# Patient Record
Sex: FEMALE | Race: WHITE | Employment: OTHER | URBAN - METROPOLITAN AREA
[De-identification: names, ages, dates, MRNs, and addresses within clinical notes are randomized per-mention and may not be internally consistent; named-entity substitution may affect disease eponyms.]

---

## 2017-04-27 ENCOUNTER — OP HISTORICAL/CONVERTED ENCOUNTER (OUTPATIENT)
Dept: OTHER | Age: 62
End: 2017-04-27

## 2017-06-28 ENCOUNTER — ANESTHESIA EVENT (OUTPATIENT)
Dept: ENDOSCOPY | Age: 62
End: 2017-06-28
Payer: MEDICARE

## 2017-06-28 ENCOUNTER — ANESTHESIA (OUTPATIENT)
Dept: ENDOSCOPY | Age: 62
End: 2017-06-28
Payer: MEDICARE

## 2017-06-28 ENCOUNTER — APPOINTMENT (OUTPATIENT)
Dept: ULTRASOUND IMAGING | Age: 62
End: 2017-06-28
Attending: INTERNAL MEDICINE
Payer: MEDICARE

## 2017-06-28 ENCOUNTER — HOSPITAL ENCOUNTER (OUTPATIENT)
Age: 62
Setting detail: OUTPATIENT SURGERY
Discharge: HOME | End: 2017-06-28
Attending: INTERNAL MEDICINE | Admitting: INTERNAL MEDICINE
Payer: MEDICARE

## 2017-06-28 VITALS
HEART RATE: 73 BPM | BODY MASS INDEX: 26.46 KG/M2 | DIASTOLIC BLOOD PRESSURE: 69 MMHG | TEMPERATURE: 97.2 F | WEIGHT: 155 LBS | SYSTOLIC BLOOD PRESSURE: 128 MMHG | RESPIRATION RATE: 15 BRPM | OXYGEN SATURATION: 95 % | HEIGHT: 64 IN

## 2017-06-28 PROCEDURE — 76060000031 HC ANESTHESIA FIRST 0.5 HR: Performed by: INTERNAL MEDICINE

## 2017-06-28 PROCEDURE — 74011000250 HC RX REV CODE- 250

## 2017-06-28 PROCEDURE — 76040000019: Performed by: INTERNAL MEDICINE

## 2017-06-28 PROCEDURE — 74011250636 HC RX REV CODE- 250/636

## 2017-06-28 RX ORDER — HYDROCHLOROTHIAZIDE 12.5 MG/1
12.5 TABLET ORAL DAILY
COMMUNITY

## 2017-06-28 RX ORDER — SODIUM CHLORIDE 9 MG/ML
INJECTION, SOLUTION INTRAVENOUS
Status: DISCONTINUED | OUTPATIENT
Start: 2017-06-28 | End: 2017-06-28 | Stop reason: HOSPADM

## 2017-06-28 RX ORDER — FLUMAZENIL 0.1 MG/ML
0.2 INJECTION INTRAVENOUS
Status: DISCONTINUED | OUTPATIENT
Start: 2017-06-28 | End: 2017-06-28 | Stop reason: HOSPADM

## 2017-06-28 RX ORDER — EPINEPHRINE 0.1 MG/ML
1 INJECTION INTRACARDIAC; INTRAVENOUS
Status: DISCONTINUED | OUTPATIENT
Start: 2017-06-28 | End: 2017-06-28 | Stop reason: HOSPADM

## 2017-06-28 RX ORDER — GABAPENTIN 600 MG/1
TABLET ORAL 3 TIMES DAILY
COMMUNITY

## 2017-06-28 RX ORDER — DEXTROMETHORPHAN/PSEUDOEPHED 2.5-7.5/.8
1.2 DROPS ORAL
Status: DISCONTINUED | OUTPATIENT
Start: 2017-06-28 | End: 2017-06-28 | Stop reason: HOSPADM

## 2017-06-28 RX ORDER — MIDAZOLAM HYDROCHLORIDE 1 MG/ML
.25-1 INJECTION, SOLUTION INTRAMUSCULAR; INTRAVENOUS
Status: DISCONTINUED | OUTPATIENT
Start: 2017-06-28 | End: 2017-06-28 | Stop reason: HOSPADM

## 2017-06-28 RX ORDER — NIFEDIPINE 60 MG/1
60 TABLET, EXTENDED RELEASE ORAL DAILY
Status: ON HOLD | COMMUNITY
End: 2021-07-05

## 2017-06-28 RX ORDER — SODIUM CHLORIDE 0.9 % (FLUSH) 0.9 %
5-10 SYRINGE (ML) INJECTION EVERY 8 HOURS
Status: DISCONTINUED | OUTPATIENT
Start: 2017-06-28 | End: 2017-06-28 | Stop reason: HOSPADM

## 2017-06-28 RX ORDER — PRAVASTATIN SODIUM 20 MG/1
20 TABLET ORAL
COMMUNITY

## 2017-06-28 RX ORDER — SODIUM CHLORIDE 0.9 % (FLUSH) 0.9 %
5-10 SYRINGE (ML) INJECTION AS NEEDED
Status: DISCONTINUED | OUTPATIENT
Start: 2017-06-28 | End: 2017-06-28 | Stop reason: HOSPADM

## 2017-06-28 RX ORDER — METHOCARBAMOL 750 MG/1
TABLET, FILM COATED ORAL 4 TIMES DAILY
Status: ON HOLD | COMMUNITY
End: 2021-07-05

## 2017-06-28 RX ORDER — LIDOCAINE HYDROCHLORIDE 20 MG/ML
INJECTION, SOLUTION INFILTRATION; PERINEURAL AS NEEDED
Status: DISCONTINUED | OUTPATIENT
Start: 2017-06-28 | End: 2017-06-28 | Stop reason: HOSPADM

## 2017-06-28 RX ORDER — ALBUTEROL SULFATE 90 UG/1
AEROSOL, METERED RESPIRATORY (INHALATION)
COMMUNITY

## 2017-06-28 RX ORDER — SODIUM CHLORIDE 9 MG/ML
50 INJECTION, SOLUTION INTRAVENOUS CONTINUOUS
Status: DISCONTINUED | OUTPATIENT
Start: 2017-06-28 | End: 2017-06-28 | Stop reason: HOSPADM

## 2017-06-28 RX ORDER — OXYCODONE HYDROCHLORIDE 5 MG/1
5 TABLET ORAL
COMMUNITY

## 2017-06-28 RX ORDER — ZOLPIDEM TARTRATE 12.5 MG/1
12.5 TABLET, FILM COATED, EXTENDED RELEASE ORAL
COMMUNITY

## 2017-06-28 RX ORDER — HYDROGEN PEROXIDE 3 %
SOLUTION, NON-ORAL MISCELLANEOUS DAILY
COMMUNITY

## 2017-06-28 RX ORDER — NALOXONE HYDROCHLORIDE 0.4 MG/ML
0.4 INJECTION, SOLUTION INTRAMUSCULAR; INTRAVENOUS; SUBCUTANEOUS
Status: DISCONTINUED | OUTPATIENT
Start: 2017-06-28 | End: 2017-06-28 | Stop reason: HOSPADM

## 2017-06-28 RX ORDER — ATROPINE SULFATE 0.1 MG/ML
0.5 INJECTION INTRAVENOUS
Status: DISCONTINUED | OUTPATIENT
Start: 2017-06-28 | End: 2017-06-28 | Stop reason: HOSPADM

## 2017-06-28 RX ORDER — ALPRAZOLAM 1 MG/1
0.5 TABLET ORAL
COMMUNITY

## 2017-06-28 RX ORDER — GLYCOPYRROLATE 0.2 MG/ML
INJECTION INTRAMUSCULAR; INTRAVENOUS AS NEEDED
Status: DISCONTINUED | OUTPATIENT
Start: 2017-06-28 | End: 2017-06-28 | Stop reason: HOSPADM

## 2017-06-28 RX ORDER — FENTANYL CITRATE 50 UG/ML
100 INJECTION, SOLUTION INTRAMUSCULAR; INTRAVENOUS
Status: DISCONTINUED | OUTPATIENT
Start: 2017-06-28 | End: 2017-06-28 | Stop reason: HOSPADM

## 2017-06-28 RX ORDER — PROPOFOL 10 MG/ML
INJECTION, EMULSION INTRAVENOUS AS NEEDED
Status: DISCONTINUED | OUTPATIENT
Start: 2017-06-28 | End: 2017-06-28 | Stop reason: HOSPADM

## 2017-06-28 RX ADMIN — PROPOFOL 40 MG: 10 INJECTION, EMULSION INTRAVENOUS at 14:44

## 2017-06-28 RX ADMIN — PROPOFOL 20 MG: 10 INJECTION, EMULSION INTRAVENOUS at 14:49

## 2017-06-28 RX ADMIN — PROPOFOL 20 MG: 10 INJECTION, EMULSION INTRAVENOUS at 14:46

## 2017-06-28 RX ADMIN — PROPOFOL 20 MG: 10 INJECTION, EMULSION INTRAVENOUS at 14:53

## 2017-06-28 RX ADMIN — PROPOFOL 80 MG: 10 INJECTION, EMULSION INTRAVENOUS at 14:43

## 2017-06-28 RX ADMIN — PROPOFOL 10 MG: 10 INJECTION, EMULSION INTRAVENOUS at 14:56

## 2017-06-28 RX ADMIN — PROPOFOL 20 MG: 10 INJECTION, EMULSION INTRAVENOUS at 14:48

## 2017-06-28 RX ADMIN — LIDOCAINE HYDROCHLORIDE 100 MG: 20 INJECTION, SOLUTION INFILTRATION; PERINEURAL at 14:42

## 2017-06-28 RX ADMIN — PROPOFOL 10 MG: 10 INJECTION, EMULSION INTRAVENOUS at 14:58

## 2017-06-28 RX ADMIN — GLYCOPYRROLATE 0.2 MG: 0.2 INJECTION INTRAMUSCULAR; INTRAVENOUS at 14:29

## 2017-06-28 RX ADMIN — PROPOFOL 20 MG: 10 INJECTION, EMULSION INTRAVENOUS at 14:47

## 2017-06-28 RX ADMIN — PROPOFOL 10 MG: 10 INJECTION, EMULSION INTRAVENOUS at 14:55

## 2017-06-28 RX ADMIN — SODIUM CHLORIDE: 9 INJECTION, SOLUTION INTRAVENOUS at 14:58

## 2017-06-28 RX ADMIN — SODIUM CHLORIDE: 9 INJECTION, SOLUTION INTRAVENOUS at 14:28

## 2017-06-28 RX ADMIN — PROPOFOL 20 MG: 10 INJECTION, EMULSION INTRAVENOUS at 14:51

## 2017-06-28 NOTE — PROCEDURES
118 [facility]  [address] [address] [address], [address]   [phone]                                Endoscopic Ultrasound    NAME:  Michelle Pierson   :   1955   MRN:   [id number]       Date/Time:  2017   Procedure Type: Linear Upper EUS without FNA    Indications: Abnormal CT scan showing dilated bile duct    Pre-operative Diagnosis: see indication above    Post-operative Diagnosis:  See findings below    : Jaiden Bernard MD    Referring Provider: Melissa Herrera MD, -RUDOLPH Chris    Anethesia/Sedation:  MAC anesthesia      Procedure Details   After infom consent was obtained for the procedure, with all risks and benefits of procedure explained the patient was taken to the endoscopy suite and placed in the left lateral decubitus position. Following sequential administration of sedation as per above, the linear echoendoscope was inserted into the mouth and advanced under direct vision to second portion of the duodenum. A careful inspection was made as the gastroscope was withdrawn, including a retroflexed view of the proximal stomach; findings and interventions are described below. 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 Ampulla was normal.   Pancreas:     Areas examined: the entire gland    Parenchyma: -The entire pancreas appeared normal. No mass lesion or cyst were seen. Pancreatic Duct: The entire pancreatic duct was normal in calibre. PD was about 3 mm in head and 2 mm in body. No filing defect or mass lesion was seen   Liver:     Parenchyma: not examined    Gallbladder: surgically absent    Bile Duct: There was cystic dilation of common bile duct extending into the central right and left hepatic ducts. Largest diameter was about 18 mm.  No filing defect or stricture was seen. The bile duct diffusely tapered into the ampulla. Lymph Node: no adenopathy        Specimen Removed:  None    Complications: None. EBL:  None.     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

## 2017-06-28 NOTE — ROUTINE PROCESS
Estella Randall  1955  [id number]    Situation:  Verbal report received from: Clint Bledsoe  Procedure: Procedure(s):  ENDOSCOPIC ULTRASOUND (EUS) UPPER LINEAR W/OUT PATH  ESOPHAGOGASTRODUODENOSCOPY (EGD)    Background:    Preoperative diagnosis: STRICTURE BOWEL DUCT   GERD   Postoperative diagnosis: 1.- Diverticulum  2.- Dilated Common Bile Duct    :  Dr. Marilyn Murray  Assistant(s): Endoscopy Technician-1: Zaheer Stone  Endoscopy RN-1: Rossana Thompson RN    Specimens: * No specimens in log *  H. Pylori  no    Assessment:  Intra-procedure medications   Anesthesia gave intra-procedure sedation and medications, see anesthesia flow sheet yes    Intravenous fluids: NS@ KVO     Vital signs stable     Abdominal assessment: round and soft     Recommendation:  Discharge patient per MD order.     Family or Friend   Permission to share finding with family or friend yes

## 2017-06-28 NOTE — DISCHARGE INSTRUCTIONS
118 [facility]  [address] [facility]  [phone]                     DISCHARGE INSTRUCTIONS    Lane Oconnor  [id number]  1955    DISCOMFORT:  Sore throat- throat lozenges or warm salt water gargle  redness at IV site- apply warm compress to area; if redness or soreness persist- contact your physician  Gaseous discomfort- walking, belching will help relieve any discomfort  You may not operate a vehicle for 12 hours  You may not engage in an occupation involving machinery or appliances for rest of today  You may not drink alcoholic beverages for at least 12 hours  Avoid making any critical decisions for at least 24 hour  DIET  You may eat and drink after you leave. You may resume your regular diet - however -  remember your colon is empty and a heavy meal will produce gas. Avoid these foods:  vegetables, fried / greasy foods, carbonated drinks    ACTIVITY  You may resume your normal daily activities   Spend the remainder of the day resting -  avoid any strenuous activity. CALL M.D. ANY SIGN OF   Increasing pain, nausea, vomiting  Abdominal distension (swelling)  New increased bleeding (oral or rectal)  Fever (chills)  Pain in chest area  Bloody discharge from nose or mouth  Shortness of breath    Follow-up Instructions:   Call Dr. Nuvia Sharp for any questions or problems. If we took a biopsy please call the office within 2 weeks to discuss your                             pathology results. Telephone # [phone]        Continue same medications.

## 2017-06-28 NOTE — IP AVS SNAPSHOT
[address] 
[phone] Patient: Jani Raymond MRN: [id number] HSY:[id number] You are allergic to the following No active allergies Recent Documentation Height Weight Breastfeeding? BMI Smoking Status 1.626 m 70.3 kg No 26.61 kg/m2 Current Some Day Smoker Emergency Contacts Name Discharge Info Relation Home Work Mobile [facility] DIS[facility] CAREGIVER [3] Sister [23] [phone] CAREGIVER [3] Spouse [3] [phone] About your hospitalization You were admitted on:  June 28, 2017 You last received care in the:  [facility] ENDOSCOPY You were discharged on:  June 28, 2017 Unit phone number:  [phone] Why you were hospitalized Your primary diagnosis was:  Not on File Providers Seen During Your Hospitalizations Provider Role Specialty Primary office phone Jan White MD Attending Provider Gastroenterology [phone] Your Primary Care Physician (PCP) Primary Care Physician Office Phone Office Fax Araseli Johnson [phone] Follow-up Information None Your Appointments Wednesday June 28, 2017  3:25 PM EDT  
XR ENDO ENDOSCOPIC ULTRASOUND with [facility] ENDO US [address] Department ([address]) [address]  
[phone] Patient should report to outpatient registration ([address] [place] [address]) 30 minutes prior to the appointment time unless instructed otherwise. (NOT FOR MRI) Current Discharge Medication List  
  
CONTINUE these medications which have NOT CHANGED Dose & Instructions Dispensing Information Comments Morning Noon Evening Bedtime AMBIEN CR 12.5 mg tablet Generic drug:  zolpidem CR Your last dose was: Your next dose is: Dose:  12.5 mg Take 12.5 mg by mouth nightly as needed for Sleep. Refills:  0  
     
   
   
   
  
 gabapentin 600 mg tablet Commonly known as:  NEURONTIN Your last dose was: Your next dose is: Take  by mouth three (3) times daily. Refills:  0  
     
   
   
   
  
 hydroCHLOROthiazide 25 mg tablet Commonly known as:  HYDRODIURIL Your last dose was: Your next dose is:    
   
   
 Dose:  25 mg Take 25 mg by mouth daily. Refills:  0 NexIUM 20 mg capsule Generic drug:  esomeprazole Your last dose was: Your next dose is: Take  by mouth daily. Refills:  0  
     
   
   
   
  
 NIFEdipine ER 60 mg ER tablet Commonly known as:  ADALAT CC Your last dose was: Your next dose is:    
   
   
 Dose:  60 mg Take 60 mg by mouth daily. Refills:  0  
     
   
   
   
  
 oxyCODONE IR 5 mg immediate release tablet Commonly known as:  Nai Johanna Your last dose was: Your next dose is:    
   
   
 Dose:  5 mg Take 5 mg by mouth every four (4) hours as needed for Pain. Refills:  0  
     
   
   
   
  
 pravastatin 20 mg tablet Commonly known as:  PRAVACHOL Your last dose was: Your next dose is:    
   
   
 Dose:  20 mg Take 20 mg by mouth nightly. Refills:  0  
     
   
   
   
  
 ROBAXIN-750 750 mg tablet Generic drug:  methocarbamol Your last dose was: Your next dose is: Take  by mouth four (4) times daily. Refills:  0 VENTOLIN HFA 90 mcg/actuation inhaler Generic drug:  albuterol Your last dose was: Your next dose is: Take  by inhalation. Refills:  0  
     
   
   
   
  
 XANAX 1 mg tablet Generic drug:  ALPRAZolam  
   
Your last dose was: Your next dose is: Take  by mouth. Refills:  0 Discharge Instructions [address] [place] 
[address] [facility] [address] [place], [address] 
[phone] DISCHARGE INSTRUCTIONS Briana Whittaker [id number] 
1955 DISCOMFORT: 
Sore throat- throat lozenges or warm salt water gargle 
redness at IV site- apply warm compress to area; if redness or soreness persist- contact your physician Gaseous discomfort- walking, belching will help relieve any discomfort You may not operate a vehicle for 12 hours You may not engage in an occupation involving machinery or appliances for rest of today You may not drink alcoholic beverages for at least 12 hours Avoid making any critical decisions for at least 24 hour DIET You may eat and drink after you leave. You may resume your regular diet  however -  remember your colon is empty and a heavy meal will produce gas. Avoid these foods:  vegetables, fried / greasy foods, carbonated drinks ACTIVITY You may resume your normal daily activities Spend the remainder of the day resting -  avoid any strenuous activity. CALL M.D. ANY SIGN OF Increasing pain, nausea, vomiting Abdominal distension (swelling) New increased bleeding (oral or rectal) Fever (chills) Pain in chest area Bloody discharge from nose or mouth Shortness of breath Follow-up Instructions: 
 Call Dr. Kemal Manzanares for any questions or problems. If we took a biopsy please call the office within 2 weeks to discuss your                             pathology results. Telephone # [phone] Continue same medications. Discharge Orders None Introducing [facility]! [employer] introduces [employer] patient portal. Now you can access parts of your medical record, email your doctor's office, and request medication refills online. 1. In your internet browser, go to [email or web address]. [email or web address] 2. Click on the First Time User? Click Here link in the Sign In box.  You will see the New Member Sign Up page. 3. Enter your [employer] Access Code exactly as it appears below. You will not need to use this code after youve completed the sign-up process. If you do not sign up before the expiration date, you must request a new code. · [employer] Access Code: [email or web address] Expires: 9/26/2017  3:23 PM 
 
4. Enter the last four digits of your Social Security Number (xxxx) and Date of Birth (mm/dd/yyyy) as indicated and click Submit. You will be taken to the next sign-up page. 5. Create a [employer] ID. This will be your [employer] login ID and cannot be changed, so think of one that is secure and easy to remember. 6. Create a [employer] password. You can change your password at any time. 7. Enter your Password Reset Question and Answer. This can be used at a later time if you forget your password. 8. Enter your e-mail address. You will receive e-mail notification when new information is available in [address]. 9. Click Sign Up. You can now view and download portions of your medical record. 10. Click the Download Summary menu link to download a portable copy of your medical information. If you have questions, please visit the Frequently Asked Questions section of the [employer] website. Remember, [employer] is NOT to be used for urgent needs. For medical emergencies, dial 911. Now available from your iPhone and Android! General Information Please provide this summary of care documentation to your next provider. Patient Signature:  ____________________________________________________________ Date:  ____________________________________________________________  
  
[facility] Provider Signature:  ____________________________________________________________ Date:  ____________________________________________________________

## 2017-06-28 NOTE — ANESTHESIA POSTPROCEDURE EVALUATION
Post-Anesthesia Evaluation and Assessment    Patient: Delvis Monk MRN: [id number]  SSN: [id number]    YOB: 1955  Age: 62 y.o. Sex: female       Cardiovascular Function/Vital Signs  Visit Vitals    /45    Pulse 76    Temp 36.2 °C (97.2 °F)    Resp 22    Ht 5' 4\" (1.626 m)    Wt 70.3 kg (155 lb)    SpO2 96%    Breastfeeding No    BMI 26.61 kg/m2       Patient is status post MAC anesthesia for Procedure(s):  ENDOSCOPIC ULTRASOUND (EUS) UPPER LINEAR W/OUT PATH  ESOPHAGOGASTRODUODENOSCOPY (EGD). Nausea/Vomiting: None    Postoperative hydration reviewed and adequate. Pain:  Pain Scale 1: Numeric (0 - 10) (06/28/17 1512)  Pain Intensity 1: 0 (06/28/17 1512)   Managed    Neurological Status: At baseline    Mental Status and Level of Consciousness: Arousable    Pulmonary Status:   O2 Device: Room air (06/28/17 1512)   Adequate oxygenation and airway patent    Complications related to anesthesia: None    Post-anesthesia assessment completed.  No concerns    Signed By: Yoon Valiente MD     June 28, 2017

## 2017-06-28 NOTE — ANESTHESIA PREPROCEDURE EVALUATION
Anesthetic History   No history of anesthetic complications            Review of Systems / Medical History  Patient summary reviewed, nursing notes reviewed and pertinent labs reviewed    Pulmonary    COPD: mild      Smoker         Neuro/Psych   Within defined limits           Cardiovascular    Hypertension              Exercise tolerance: >4 METS     GI/Hepatic/Renal     GERD          Comments: Bile duct stricture Endo/Other        Arthritis     Other Findings            Physical Exam    Airway  Mallampati: I  TM Distance: > 6 cm    Mouth opening: Normal     Cardiovascular    Rhythm: regular  Rate: normal         Dental  No notable dental hx       Pulmonary  Breath sounds clear to auscultation               Abdominal  Abdominal exam normal       Other Findings            Anesthetic Plan    ASA: 2  Anesthesia type: MAC          Induction: Intravenous  Anesthetic plan and risks discussed with: Patient

## 2017-06-28 NOTE — PROGRESS NOTES

## 2017-06-28 NOTE — H&P
118 [facility]3].  [address] [facility] [address], [address]  [phone]                                History and Physical     NAME: Estella Randall   :  1955   MRN:  [id number]     HPI:  The patient was seen and examined.     Past Surgical History:   Procedure Laterality Date    ABDOMEN SURGERY PROC UNLISTED      HX GYN      Partial hysterectomy    HX ORTHOPAEDIC      Left hand ganglion cyst removal    HX OTHER SURGICAL      Cholecystectomy    NEUROLOGICAL PROCEDURE UNLISTED      Neck fusion and tumor excised from the neck     Past Medical History:   Diagnosis Date    Arthritis     Asthma     Chronic obstructive pulmonary disease (HCC)     Chronic pain     Hypertension     Nicotine vapor product user      Social History   Substance Use Topics    Smoking status: Current Some Day Smoker     Packs/day: 1.00     Years: 40.00    Smokeless tobacco: Never Used    Alcohol use No     No Known Allergies  Family History   Problem Relation Age of Onset    Alzheimer Mother     Heart Disease Father      Current Facility-Administered Medications   Medication Dose Route Frequency    0.9% sodium chloride infusion  50 mL/hr IntraVENous CONTINUOUS    sodium chloride (NS) flush 5-10 mL  5-10 mL IntraVENous Q8H    sodium chloride (NS) flush 5-10 mL  5-10 mL IntraVENous PRN    midazolam (VERSED) injection 0.25-10 mg  0.25-10 mg IntraVENous Multiple    fentaNYL citrate (PF) injection 100 mcg  100 mcg IntraVENous MULTIPLE DOSE GIVEN    naloxone (NARCAN) injection 0.4 mg  0.4 mg IntraVENous Multiple    flumazenil (ROMAZICON) 0.1 mg/mL injection 0.2 mg  0.2 mg IntraVENous Multiple    simethicone (MYLICON) 12KO/7.3CF oral drops 80 mg  1.2 mL Oral Multiple    atropine injection 0.5 mg  0.5 mg IntraVENous ONCE PRN    EPINEPHrine (ADRENALIN) 0.1 mg/mL syringe 1 mg  1 mg Endoscopically ONCE PRN     Facility-Administered Medications Ordered in Other Encounters   Medication Dose Route Frequency    0.9% sodium chloride infusion   IntraVENous CONTINUOUS    glycopyrrolate (ROBINUL) injection   IntraVENous PRN         PHYSICAL EXAM:  General: WD, WN. Alert, cooperative, no acute distress    HEENT: NC, Atraumatic. PERRLA, EOMI. Anicteric sclerae. Lungs:  CTA Bilaterally. No Wheezing/Rhonchi/Rales. Heart:  Regular  rhythm,  No murmur, No Rubs, No Gallops  Abdomen: Soft, Non distended, Non tender.  +Bowel sounds, no HSM  Extremities: No c/c/e  Neurologic:  CN 2-12 gi, Alert and oriented X 3. No acute neurological distress   Psych:   Good insight. Not anxious nor agitated. The heart, lungs and mental status were satisfactory for the administration of MAC sedation and for the procedure.       Mallampati score: 3       Assessment:   · R/O CBD stone    Plan:   · Endoscopic procedure  · MAC sedation   ·

## 2017-08-10 ENCOUNTER — OP HISTORICAL/CONVERTED ENCOUNTER (OUTPATIENT)
Dept: OTHER | Age: 62
End: 2017-08-10

## 2018-05-24 ENCOUNTER — OP HISTORICAL/CONVERTED ENCOUNTER (OUTPATIENT)
Dept: OTHER | Age: 63
End: 2018-05-24

## 2020-06-09 ENCOUNTER — OP HISTORICAL/CONVERTED ENCOUNTER (OUTPATIENT)
Dept: OTHER | Age: 65
End: 2020-06-09

## 2021-06-07 ENCOUNTER — TRANSCRIBE ORDER (OUTPATIENT)
Dept: SCHEDULING | Age: 66
End: 2021-06-07

## 2021-06-07 DIAGNOSIS — M54.2 CERVICALGIA: Primary | ICD-10-CM

## 2021-06-07 DIAGNOSIS — M48.02 CERVICAL STENOSIS OF SPINE: ICD-10-CM

## 2021-06-08 ENCOUNTER — TRANSCRIBE ORDER (OUTPATIENT)
Dept: SCHEDULING | Age: 66
End: 2021-06-08

## 2021-06-08 DIAGNOSIS — Q44.4 CHOLEDOCHAL CYST: Primary | ICD-10-CM

## 2021-06-08 DIAGNOSIS — R11.0 NAUSEA: ICD-10-CM

## 2021-06-17 ENCOUNTER — TRANSCRIBE ORDER (OUTPATIENT)
Dept: SCHEDULING | Age: 66
End: 2021-06-17

## 2021-06-17 DIAGNOSIS — E78.41 ELEVATED LIPOPROTEIN A LEVEL: ICD-10-CM

## 2021-06-17 DIAGNOSIS — M06.9 ATROPHIC ARTHRITIS (HCC): Primary | ICD-10-CM

## 2021-06-17 DIAGNOSIS — M48.02 CERVICAL STENOSIS OF SPINE: ICD-10-CM

## 2021-06-17 DIAGNOSIS — M54.2 CERVICALGIA: ICD-10-CM

## 2021-06-24 ENCOUNTER — TRANSCRIBE ORDER (OUTPATIENT)
Dept: REGISTRATION | Age: 66
End: 2021-06-24

## 2021-06-24 ENCOUNTER — HOSPITAL ENCOUNTER (OUTPATIENT)
Dept: LAB | Age: 66
Discharge: HOME | End: 2021-06-24
Payer: MEDICARE

## 2021-06-24 ENCOUNTER — HOSPITAL ENCOUNTER (OUTPATIENT)
Dept: MRI IMAGING | Age: 66
Discharge: HOME | End: 2021-06-24
Payer: MEDICARE

## 2021-06-24 DIAGNOSIS — Q44.4 CONGENITAL CHOLEDOCHAL CYST: ICD-10-CM

## 2021-06-24 DIAGNOSIS — Q44.4 CONGENITAL CHOLEDOCHAL CYST: Primary | ICD-10-CM

## 2021-06-24 DIAGNOSIS — Q44.4 CHOLEDOCHAL CYST: ICD-10-CM

## 2021-06-24 DIAGNOSIS — R11.0 NAUSEA: ICD-10-CM

## 2021-06-24 LAB — CREAT SERPL-MCNC: 0.61 MG/DL (ref 0.55–1.02)

## 2021-06-24 PROCEDURE — A9577 INJ MULTIHANCE: HCPCS | Performed by: SPECIALIST

## 2021-06-24 PROCEDURE — 82565 ASSAY OF CREATININE: CPT

## 2021-06-24 PROCEDURE — 36415 COLL VENOUS BLD VENIPUNCTURE: CPT

## 2021-06-24 PROCEDURE — 74183 MRI ABD W/O CNTR FLWD CNTR: CPT

## 2021-06-24 PROCEDURE — 74011250636 HC RX REV CODE- 250/636: Performed by: SPECIALIST

## 2021-06-24 RX ADMIN — GADOBENATE DIMEGLUMINE 18 ML: 529 INJECTION, SOLUTION INTRAVENOUS at 13:00

## 2021-07-05 ENCOUNTER — HOSPITAL ENCOUNTER (OUTPATIENT)
Age: 66
Setting detail: OUTPATIENT SURGERY
Discharge: HOME | End: 2021-07-05
Attending: ORTHOPAEDIC SURGERY | Admitting: ORTHOPAEDIC SURGERY
Payer: MEDICARE

## 2021-07-05 ENCOUNTER — APPOINTMENT (OUTPATIENT)
Dept: GENERAL RADIOLOGY | Age: 66
End: 2021-07-05
Attending: ORTHOPAEDIC SURGERY
Payer: MEDICARE

## 2021-07-05 ENCOUNTER — ANESTHESIA (OUTPATIENT)
Dept: SURGERY | Age: 66
End: 2021-07-05
Payer: MEDICARE

## 2021-07-05 ENCOUNTER — ANESTHESIA EVENT (OUTPATIENT)
Dept: SURGERY | Age: 66
End: 2021-07-05
Payer: MEDICARE

## 2021-07-05 VITALS
TEMPERATURE: 97.6 F | BODY MASS INDEX: 29.7 KG/M2 | WEIGHT: 173.94 LBS | HEART RATE: 87 BPM | HEIGHT: 64 IN | RESPIRATION RATE: 26 BRPM | SYSTOLIC BLOOD PRESSURE: 117 MMHG | DIASTOLIC BLOOD PRESSURE: 62 MMHG | OXYGEN SATURATION: 96 %

## 2021-07-05 PROCEDURE — 74011000250 HC RX REV CODE- 250: Performed by: NURSE ANESTHETIST, CERTIFIED REGISTERED

## 2021-07-05 PROCEDURE — 74011250636 HC RX REV CODE- 250/636: Performed by: STUDENT IN AN ORGANIZED HEALTH CARE EDUCATION/TRAINING PROGRAM

## 2021-07-05 PROCEDURE — 76210000006 HC OR PH I REC 0.5 TO 1 HR: Performed by: ORTHOPAEDIC SURGERY

## 2021-07-05 PROCEDURE — 77030000032 HC CUF TRNQT ZIMM -B: Performed by: ORTHOPAEDIC SURGERY

## 2021-07-05 PROCEDURE — 77030020268 HC MISC GENERAL SUPPLY: Performed by: ORTHOPAEDIC SURGERY

## 2021-07-05 PROCEDURE — C1713 ANCHOR/SCREW BN/BN,TIS/BN: HCPCS | Performed by: ORTHOPAEDIC SURGERY

## 2021-07-05 PROCEDURE — 77030002933 HC SUT MCRYL J&J -A: Performed by: ORTHOPAEDIC SURGERY

## 2021-07-05 PROCEDURE — 77030035361 HC BIT DRL SLD PA GEMNS SKEL -B: Performed by: ORTHOPAEDIC SURGERY

## 2021-07-05 PROCEDURE — 74011250636 HC RX REV CODE- 250/636: Performed by: NURSE ANESTHETIST, CERTIFIED REGISTERED

## 2021-07-05 PROCEDURE — 77030037933 HC DRV DISP SKEL -B: Performed by: ORTHOPAEDIC SURGERY

## 2021-07-05 PROCEDURE — 77030021122 HC SPLNT MAT FST BSNM -A: Performed by: ORTHOPAEDIC SURGERY

## 2021-07-05 PROCEDURE — 77030038840 HC GD AIM SKEL -B: Performed by: ORTHOPAEDIC SURGERY

## 2021-07-05 PROCEDURE — 77030031139 HC SUT VCRL2 J&J -A: Performed by: ORTHOPAEDIC SURGERY

## 2021-07-05 PROCEDURE — 77030020143 HC AIRWY LARYN INTUB CGAS -A: Performed by: STUDENT IN AN ORGANIZED HEALTH CARE EDUCATION/TRAINING PROGRAM

## 2021-07-05 PROCEDURE — 2709999900 HC NON-CHARGEABLE SUPPLY: Performed by: ORTHOPAEDIC SURGERY

## 2021-07-05 PROCEDURE — 76010000160 HC OR TIME 0.5 TO 1 HR INTENSV-TIER 1: Performed by: ORTHOPAEDIC SURGERY

## 2021-07-05 PROCEDURE — 77030002916 HC SUT ETHLN J&J -A: Performed by: ORTHOPAEDIC SURGERY

## 2021-07-05 PROCEDURE — 76060000032 HC ANESTHESIA 0.5 TO 1 HR: Performed by: ORTHOPAEDIC SURGERY

## 2021-07-05 PROCEDURE — 74011250636 HC RX REV CODE- 250/636: Performed by: ORTHOPAEDIC SURGERY

## 2021-07-05 PROCEDURE — 74011000250 HC RX REV CODE- 250: Performed by: ORTHOPAEDIC SURGERY

## 2021-07-05 PROCEDURE — 77030010777 HC CRDL FT DERY -B: Performed by: ORTHOPAEDIC SURGERY

## 2021-07-05 PROCEDURE — 77030035360 HC BIT DRL SKEL -B: Performed by: ORTHOPAEDIC SURGERY

## 2021-07-05 PROCEDURE — 77030040356 HC CORD BPLR FRCP COVD -A: Performed by: ORTHOPAEDIC SURGERY

## 2021-07-05 PROCEDURE — 76210000020 HC REC RM PH II FIRST 0.5 HR: Performed by: ORTHOPAEDIC SURGERY

## 2021-07-05 DEVICE — PEG BNE FIX L20MM DIA2MM DST RAD TI SMOOTH FOR VOLAR: Type: IMPLANTABLE DEVICE | Site: WRIST | Status: FUNCTIONAL

## 2021-07-05 DEVICE — PLATE BNE 3 H L DST VOLAR RAD TI NAR GEMINUS: Type: IMPLANTABLE DEVICE | Site: WRIST | Status: FUNCTIONAL

## 2021-07-05 DEVICE — SCR BNE CORT NLCK 3.5X11MM -- GEMINUS: Type: IMPLANTABLE DEVICE | Site: WRIST | Status: FUNCTIONAL

## 2021-07-05 DEVICE — SCR CORT LCK 3.5X11MM --: Type: IMPLANTABLE DEVICE | Site: WRIST | Status: FUNCTIONAL

## 2021-07-05 DEVICE — SCR CORT NLCK 3.5X10MM -- GEMINUS: Type: IMPLANTABLE DEVICE | Site: WRIST | Status: FUNCTIONAL

## 2021-07-05 DEVICE — PEG BNE FIX L18MM DIA2MM DST RAD TI OPT 2 LOK SMOOTH: Type: IMPLANTABLE DEVICE | Site: WRIST | Status: FUNCTIONAL

## 2021-07-05 RX ORDER — HYDROMORPHONE HYDROCHLORIDE 1 MG/ML
.5-1 INJECTION, SOLUTION INTRAMUSCULAR; INTRAVENOUS; SUBCUTANEOUS
Status: DISCONTINUED | OUTPATIENT
Start: 2021-07-05 | End: 2021-07-05 | Stop reason: HOSPADM

## 2021-07-05 RX ORDER — IBUPROFEN 200 MG
200 TABLET ORAL 2 TIMES DAILY
COMMUNITY

## 2021-07-05 RX ORDER — MIDAZOLAM HYDROCHLORIDE 1 MG/ML
INJECTION, SOLUTION INTRAMUSCULAR; INTRAVENOUS AS NEEDED
Status: DISCONTINUED | OUTPATIENT
Start: 2021-07-05 | End: 2021-07-05 | Stop reason: HOSPADM

## 2021-07-05 RX ORDER — LIDOCAINE HYDROCHLORIDE 10 MG/ML
0.1 INJECTION, SOLUTION EPIDURAL; INFILTRATION; INTRACAUDAL; PERINEURAL AS NEEDED
Status: DISCONTINUED | OUTPATIENT
Start: 2021-07-05 | End: 2021-07-05 | Stop reason: HOSPADM

## 2021-07-05 RX ORDER — LIDOCAINE HYDROCHLORIDE 20 MG/ML
INJECTION, SOLUTION EPIDURAL; INFILTRATION; INTRACAUDAL; PERINEURAL AS NEEDED
Status: DISCONTINUED | OUTPATIENT
Start: 2021-07-05 | End: 2021-07-05 | Stop reason: HOSPADM

## 2021-07-05 RX ORDER — SODIUM CHLORIDE, SODIUM LACTATE, POTASSIUM CHLORIDE, CALCIUM CHLORIDE 600; 310; 30; 20 MG/100ML; MG/100ML; MG/100ML; MG/100ML
INJECTION, SOLUTION INTRAVENOUS
Status: DISCONTINUED | OUTPATIENT
Start: 2021-07-05 | End: 2021-07-05 | Stop reason: HOSPADM

## 2021-07-05 RX ORDER — BUDESONIDE AND FORMOTEROL FUMARATE DIHYDRATE 80; 4.5 UG/1; UG/1
2 AEROSOL RESPIRATORY (INHALATION)
COMMUNITY

## 2021-07-05 RX ORDER — PHENYLEPHRINE HCL IN 0.9% NACL 0.4MG/10ML
SYRINGE (ML) INTRAVENOUS AS NEEDED
Status: DISCONTINUED | OUTPATIENT
Start: 2021-07-05 | End: 2021-07-05 | Stop reason: HOSPADM

## 2021-07-05 RX ORDER — ONDANSETRON 2 MG/ML
4 INJECTION INTRAMUSCULAR; INTRAVENOUS AS NEEDED
Status: DISCONTINUED | OUTPATIENT
Start: 2021-07-05 | End: 2021-07-05 | Stop reason: HOSPADM

## 2021-07-05 RX ORDER — SODIUM CHLORIDE, SODIUM LACTATE, POTASSIUM CHLORIDE, CALCIUM CHLORIDE 600; 310; 30; 20 MG/100ML; MG/100ML; MG/100ML; MG/100ML
125 INJECTION, SOLUTION INTRAVENOUS CONTINUOUS
Status: DISCONTINUED | OUTPATIENT
Start: 2021-07-05 | End: 2021-07-05 | Stop reason: HOSPADM

## 2021-07-05 RX ORDER — ONDANSETRON HYDROCHLORIDE 8 MG/1
8 TABLET, FILM COATED ORAL
COMMUNITY

## 2021-07-05 RX ORDER — FLUTICASONE PROPIONATE 50 MCG
2 SPRAY, SUSPENSION (ML) NASAL DAILY
COMMUNITY

## 2021-07-05 RX ORDER — EPHEDRINE SULFATE/0.9% NACL/PF 50 MG/5 ML
SYRINGE (ML) INTRAVENOUS AS NEEDED
Status: DISCONTINUED | OUTPATIENT
Start: 2021-07-05 | End: 2021-07-05 | Stop reason: HOSPADM

## 2021-07-05 RX ORDER — PROPOFOL 10 MG/ML
INJECTION, EMULSION INTRAVENOUS AS NEEDED
Status: DISCONTINUED | OUTPATIENT
Start: 2021-07-05 | End: 2021-07-05 | Stop reason: HOSPADM

## 2021-07-05 RX ORDER — ROPIVACAINE HYDROCHLORIDE 5 MG/ML
INJECTION, SOLUTION EPIDURAL; INFILTRATION; PERINEURAL
Status: SHIPPED | OUTPATIENT
Start: 2021-07-05 | End: 2021-07-05

## 2021-07-05 RX ORDER — FENTANYL CITRATE 50 UG/ML
INJECTION, SOLUTION INTRAMUSCULAR; INTRAVENOUS AS NEEDED
Status: DISCONTINUED | OUTPATIENT
Start: 2021-07-05 | End: 2021-07-05 | Stop reason: HOSPADM

## 2021-07-05 RX ADMIN — Medication 80 MCG: at 13:38

## 2021-07-05 RX ADMIN — LIDOCAINE HYDROCHLORIDE 100 MG: 20 INJECTION, SOLUTION EPIDURAL; INFILTRATION; INTRACAUDAL; PERINEURAL at 13:34

## 2021-07-05 RX ADMIN — FENTANYL CITRATE 50 MCG: 50 INJECTION, SOLUTION INTRAMUSCULAR; INTRAVENOUS at 13:25

## 2021-07-05 RX ADMIN — CEFAZOLIN SODIUM 2 G: 1 POWDER, FOR SOLUTION INTRAMUSCULAR; INTRAVENOUS at 13:40

## 2021-07-05 RX ADMIN — MIDAZOLAM 2 MG: 1 INJECTION, SOLUTION INTRAMUSCULAR; INTRAVENOUS at 12:22

## 2021-07-05 RX ADMIN — MIDAZOLAM 2 MG: 1 INJECTION, SOLUTION INTRAMUSCULAR; INTRAVENOUS at 13:25

## 2021-07-05 RX ADMIN — Medication 10 MG: at 13:52

## 2021-07-05 RX ADMIN — PROPOFOL 150 MG: 10 INJECTION, EMULSION INTRAVENOUS at 13:34

## 2021-07-05 RX ADMIN — FENTANYL CITRATE 50 MCG: 50 INJECTION, SOLUTION INTRAMUSCULAR; INTRAVENOUS at 14:25

## 2021-07-05 RX ADMIN — ROPIVACAINE HYDROCHLORIDE 20 ML: 5 INJECTION, SOLUTION EPIDURAL; INFILTRATION; PERINEURAL at 12:25

## 2021-07-05 RX ADMIN — Medication 10 MG: at 13:57

## 2021-07-05 RX ADMIN — SODIUM CHLORIDE, POTASSIUM CHLORIDE, SODIUM LACTATE AND CALCIUM CHLORIDE: 600; 310; 30; 20 INJECTION, SOLUTION INTRAVENOUS at 12:01

## 2021-07-05 NOTE — H&P
History and Physical    Patient: Hannah Machuca MRN: [id number]  SSN: [id number]    YOB: 1955  Age: 60 y.o. Sex: female      Subjective:      Hannah Machuca is a 60 y.o. female who suffered and injury and sustained a left distla radius fracture. Here for surgery. Past Medical History:   Diagnosis Date    Arthritis     Asthma     Chronic obstructive pulmonary disease (HCC)     Chronic pain     Hypertension     Nicotine vapor product user      Past Surgical History:   Procedure Laterality Date    ABDOMEN SURGERY PROC UNLISTED      HX GYN      Partial hysterectomy    HX ORTHOPAEDIC      Left hand ganglion cyst removal    HX OTHER SURGICAL      Cholecystectomy    NEUROLOGICAL PROCEDURE UNLISTED      Neck fusion and tumor excised from the neck      Family History   Problem Relation Age of Onset    Alzheimer Mother     Heart Disease Father      Social History     Tobacco Use    Smoking status: Current Some Day Smoker     Packs/day: 1.00     Years: 40.00     Pack years: 40.00    Smokeless tobacco: Never Used   Substance Use Topics    Alcohol use: No      Prior to Admission medications    Medication Sig Start Date End Date Taking? Authorizing Provider   hydroCHLOROthiazide (HYDRODIURIL) 25 mg tablet Take 25 mg by mouth daily. Provider, Historical   zolpidem CR (AMBIEN CR) 12.5 mg tablet Take 12.5 mg by mouth nightly as needed for Sleep. Provider, Historical   esomeprazole (NEXIUM) 20 mg capsule Take  by mouth daily. Provider, Historical   pravastatin (PRAVACHOL) 20 mg tablet Take 20 mg by mouth nightly. Provider, Historical   ALPRAZolam (XANAX) 1 mg tablet Take  by mouth. Provider, Historical   oxyCODONE IR (ROXICODONE) 5 mg immediate release tablet Take 5 mg by mouth every four (4) hours as needed for Pain. Provider, Historical   gabapentin (NEURONTIN) 600 mg tablet Take  by mouth three (3) times daily.     Provider, Historical   albuterol (VENTOLIN HFA) 90 mcg/actuation inhaler Take  by inhalation. Provider, Historical        No Known Allergies    Review of Systems:  A comprehensive review of systems was negative except for that written in the History of Present Illness. Objective:     Vitals:    07/05/21 1219   BP: 125/84   Pulse: 80   Resp: 18   Temp: 98.3 °F (36.8 °C)   SpO2: 95%   Weight: 78.9 kg (173 lb 15.1 oz)   Height: 5' 4\" (1.626 m)        Physical Exam:  GENERAL: alert, cooperative, no distress, appears stated age  Ctab/rrr  Splint intct  musc compts soft  Assessment:     L distal radius fx, posible scaphoid waist fx    Plan:     Plan for orif today.       Signed By: Freddie North MD     July 5, 2021

## 2021-07-05 NOTE — ANESTHESIA PROCEDURE NOTES
Peripheral Block    Start time: 7/5/2021 12:20 PM  End time: 7/5/2021 12:28 PM  Performed by: Mian cAkerman DO  Authorized by: Mian Ackerman DO       Pre-procedure:    Indications: post-op pain management    Preanesthetic Checklist: patient identified, risks and benefits discussed, site marked, timeout performed, anesthesia consent given and patient being monitored    Timeout Time: 12:20 EDT          Block Type:   Block Type:  Supraclavicular  Laterality:  Left  Monitoring:  Standard ASA monitoring, continuous pulse ox, responsive to questions, oxygen, frequent vital sign checks and heart rate  Procedures: ultrasound guided    Patient Position: seated  Prep: chlorhexidine    Location:  Supraclavicular  Needle Type:  SonoPlex  Needle Gauge:  22 G  Needle Localization:  Ultrasound guidance  Medication Injected:  Ropivacaine (PF) (NAROPIN)(0.5%) 5 mg/mL injection, 20 mL  Med Admin Time: 7/5/2021 12:25 PM    Assessment:  Number of attempts:  1  Injection Assessment:  Incremental injection every 5 mL, local visualized surrounding nerve on ultrasound, negative aspiration for blood, no intravascular symptoms, ultrasound image on chart and no paresthesia

## 2021-07-05 NOTE — BRIEF OP NOTE
Brief Postoperative Note    Patient: Kimberly Peterson  YOB: 1955  MRN: [id number]    Date of Procedure: 7/5/2021     Pre-Op Diagnosis: Left Wrist Fracture    Post-Op Diagnosis: Same as preoperative diagnosis. Procedure(s):  LEFT WRIST DISTAL RADIUS OPEN REDUCTION INTERNAL FIXATION    Surgeon(s):  Warden Jackie MD    Surgical Assistant: Surg Asst-1: Maggy Sifuentes    Anesthesia: General     Estimated Blood Loss (mL): Minimal    Complications: None    Specimens: * No specimens in log *     Implants:   Implant Name Type Inv.  Item Serial No.  Lot No. LRB No. Used Action   skeletal dynamics three hole volar distal plate left   NA [employer] NA Left 1 Implanted   PEG BNE FIX L20MM DIA2MM DST RAD TI SMOOTH FOR VOLAR - SNA  PEG BNE FIX L20MM DIA2MM DST RAD TI SMOOTH FOR VOLAR NA [employer]_WD NA Left 2 Implanted   SCR VIVIANA LCK 3.5X11MM --  - SNA  SCR VIVIANA LCK 3.5X11MM --  NA [employer]_WD NA Left 1 Implanted   SCREW BNE L10MM DIA3.5MM VIVIANA DST RAD VOLAR TI NONLOCKING - SNA  SCREW BNE L10MM DIA3.5MM VIVIANA DST RAD VOLAR TI NONLOCKING NA [employer]_WD NA Left 1 Implanted   SCREW BNE L11MM DIA3.5MM VIVIANA DST VOLAR RAD TI NONLOCKING - SNA  SCREW BNE L11MM DIA3.5MM VIVIANA DST VOLAR RAD TI NONLOCKING NA [employer]_WD NA Left 1 Implanted       Drains: * No LDAs found *    Findings: stable pos reduction, scaphoid examined with mini c arm no visualized fracture    Electronically Signed by Obdulia Jeff MD on 7/5/2021 at 2:44 PM

## 2021-07-05 NOTE — ANESTHESIA POSTPROCEDURE EVALUATION
Procedure(s):  LEFT WRIST DISTAL RADIUS OPEN REDUCTION INTERNAL FIXATION. regional, general    Anesthesia Post Evaluation      Multimodal analgesia: multimodal analgesia used between 6 hours prior to anesthesia start to PACU discharge  Patient location during evaluation: PACU  Patient participation: complete - patient participated  Level of consciousness: awake and alert  Pain management: adequate  Airway patency: patent  Anesthetic complications: no  Cardiovascular status: acceptable  Respiratory status: acceptable  Hydration status: acceptable  Post anesthesia nausea and vomiting:  none  Final Post Anesthesia Temperature Assessment:  Normothermia (36.0-37.5 degrees C)      INITIAL Post-op Vital signs:   Vitals Value Taken Time   /67 07/05/21 1505   Temp     Pulse 86 07/05/21 1508   Resp 17 07/05/21 1508   SpO2 98 % 07/05/21 1508   Vitals shown include unvalidated device data.

## 2021-07-05 NOTE — ANESTHESIA PREPROCEDURE EVALUATION
Anesthetic History   No history of anesthetic complications            Review of Systems / Medical History  Patient summary reviewed, nursing notes reviewed and pertinent labs reviewed    Pulmonary    COPD: mild      Smoker    Pertinent negatives: No shortness of breath and recent URI     Neuro/Psych         Psychiatric history     Cardiovascular    Hypertension              Exercise tolerance: >4 METS     GI/Hepatic/Renal     GERD: well controlled          Comments: Bile duct stricture Endo/Other        Arthritis     Other Findings              Physical Exam    Airway  Mallampati: II  TM Distance: 4 - 6 cm    Mouth opening: Normal     Cardiovascular    Rhythm: regular  Rate: normal         Dental    Dentition: Lower dentition intact and Upper dentition intact     Pulmonary  Breath sounds clear to auscultation               Abdominal  Abdominal exam normal       Other Findings            Anesthetic Plan    ASA: 2  Anesthesia type: regional and general - supraclavicular block          Induction: Intravenous  Anesthetic plan and risks discussed with: Patient

## 2021-07-05 NOTE — DISCHARGE INSTRUCTIONS
DISCHARGE SUMMARY from your Nurse      PATIENT INSTRUCTIONS    After general anesthesia or intravenous sedation, for 24 hours or while taking prescription Narcotics:  · Limit your activities  · Do not drive and operate hazardous machinery  · Do not make important personal or business decisions  · Do  not drink alcoholic beverages  · If you have not urinated within 8 hours after discharge, please contact your surgeon on call. Report the following to your surgeon:  · Excessive pain, swelling, redness or odor of or around the surgical area  · Temperature over 100.5  · Nausea and vomiting lasting longer than 4 hours or if unable to take medications  · Any signs of decreased circulation or nerve impairment to extremity: change in color, persistent  numbness, tingling, coldness or increase pain  · Any questions      GOOD HELP TO FIGHT AN INFECTION  Here are a few tip to help reduce the chance of getting an infection after surgery:   Wash Your Hands   Good handwashing is the most important thing you and your caregiver can do.  Wash before and after caring for any wounds. Dry your hand with a clean towel.  Wash with soap and water for at least 20 seconds. A TIP: sing the \"Happy Birthday\" song through one time while washing to help with the timing.  Use a hand  in between washings.  Shower   When your surgeon says it is OK to take a shower, use a new bar of antibacterial soap (if that is what you use, and keep that bar of soap ONLY for your use), or antibacterial body wash.  Use a clean wash cloth or sponge when you bathe.  Dry off with a clean towel  after every bath - be careful around any wounds, skin staples, sutures or surgical glue over/on wounds.  Do not enter swimming pools, hot tubs, lakes, rivers and/or ocean until wounds are healed and your doctor/surgeon says it is OK.  Use Clean Sheets   Sleep on freshly laundered sheets after your surgery.    Keep the surgery site covered with a clean, dry bandage (if instructed to do so). If the bandage becomes soiled, reapply a new, dry, clean bandage.  Do not allow pets to sleep with you while your wound is healing.  Lifestyle Modification and Controlling Your Blood Sugar   Smoking slows wound healing. Stop smoking and limit exposure to second-hand smoke.  High blood sugar slows wound healing. Eat a well-balanced diet to provide proper nutrition while healing   Monitor your blood sugar (if you are a diabetic) and take your medications as you are suppose to so you can control you blood sugar after surgery. COUGH AND DEEP BREATHE    Breathing deeply and coughing are very important exercises to do after surgery. Deep breathing and coughing open the little air tubes and air sacks in your lungs. You take deep breaths every day. You may not even notice - it is just something you do when you sigh or yawn. It is a natural exercise you do to keep these air passages open. After surgery, take deep breaths and cough, on purpose. DIRECTIONS:  · Take 10 to 15 slow deep breaths every hour while awake. · Breathe in deeply, and hold it for 2 seconds. · Exhale slowly through puckered lips, like blowing up a balloon. · After every 4th or 5th deep breath, hug your pillow to your chest or belly and give a hard, deep cough. Yes, it will probably hurt. But doing this exercise is a very important part of healing after surgery. Take your pain medicine to help you do this exercise without too much pain. Coughing and deep breathing help prevent bronchitis and pneumonia after surgery. If you had chest or belly surgery, use a pillow as a \"hug lynne\" and hold it tightly to your chest or belly when you cough. ANKLE PUMPS    Ankle pumps increase the circulation of oxygenated blood to your lower extremities and decrease your risk for circulation problems such as blood clots.  They also stretch the muscles, tendons and ligaments in your foot and ankle, and prevent joint contracture in the ankle and foot, especially after surgeries on the legs. It is important to do ankle pump exercises regularly after surgery because immobility increases your risk for developing a blood clot. Your doctor may also have you take an Aspirin for the next few days as well. If your doctor did not ask you to take an Aspirin, consult with him before starting Aspirin therapy on your own. The exercise is quite simple. · Slowly point your foot forward, feeling the muscles on the top of your lower leg stretch, and hold this position for 5 seconds. · Next, pull your foot back toward you as far as possible, stretching the calf muscles, and hold that position for 5 seconds. · Repeat with the other foot. · Perform 10 repetitions every hour while awake for both ankles if possible (down and then up with the foot once is one repetition). You should feel gentle stretching of the muscles in your lower leg when doing this exercise. If you feel pain, or your range of motion is limited, don't push too hard. Only go the limit your joint and muscles will let you go. If you have increasing pain, progressively worsening leg warmth or swelling, STOP the exercise and call your doctor. MEDICATION AND   SIDE EFFECT GUIDE    The [facility] MEDICATION AND SIDE EFFECT GUIDE was provided to the PATIENT AND CARE PROVIDER. Information provided includes instruction about drug purpose and common side effects for the following medications:   · Percocet        These are general instructions for a healthy lifestyle:    *   Please give a list of your current medications to your Primary Care Provider. *   Please update this list whenever your medications are discontinued, doses are changed, or new medications (including over-the-counter products) are added.   *   Please carry medication information at all times in case of emergency situations. About Smoking  No smoking / No tobacco products  Avoid exposure to second hand smoke     Surgeon General's Warning:  Quitting smoking now greatly reduces serious risk to your health. Obesity, smoking, and sedentary lifestyle greatly increases your risk for illness and disease. A healthy diet, regular physical exercise & weight monitoring are important for maintaining a healthy lifestyle. Congestive Heart Failure  You may be retaining fluid if you have a history of heart failure or if you experience any of the following symptoms:  Weight gain of 3 pounds or more overnight or 5 pounds in a week, increased swelling in your hands or feet or shortness of breath while lying flat in bed. Please call your doctor as soon as you notice any of these symptoms; do not wait until your next office visit. Recognize signs and symptoms of STROKE:  F -  Face looks uneven  A -  Arms unable to move or move evenly  S -  Speech slurred or non-existent  T -  Time-call 911 as soon as signs and symptoms begin-DO NOT go          back to bed or wait to see if you get better-TIME IS BRAIN. Warning Signs of HEART ATTACK   Call 911 if you have these symptoms:     Chest discomfort. Most heart attacks involve discomfort in the center of the chest that lasts more than a few minutes, or that goes away and comes back. It can feel like uncomfortable pressure, squeezing, fullness, or pain.  Discomfort in other areas of the upper body. Symptoms can include pain or discomfort in one or both arms, the back, neck, jaw, or stomach.  Shortness of breath with or without chest discomfort.  Other signs may include breaking out in a cold sweat, nausea, or lightheadedness. Don't wait more than five minutes to call 911 - MINUTES MATTER! Fast action can save your life. Calling 911 is almost always the fastest way to get lifesaving treatment.  Emergency Medical Services staff can begin treatment when they arrive -- up to an hour sooner than if someone gets to the hospital by car. Learning About Coronavirus [phone])  Coronavirus [phone]): Overview  What is coronavirus (COVID-19)? The coronavirus disease (COVID-19) is caused by a virus. It is an illness that was first found in [place], [place], in December 2019. It has since spread worldwide. The virus can cause fever, cough, and trouble breathing. In severe cases, it can cause pneumonia and make it hard to breathe without help. It can cause death. Coronaviruses are a large group of viruses. They cause the common cold. They also cause more serious illnesses like Middle East respiratory syndrome (MERS) and severe acute respiratory syndrome (SARS). COVID-19 is caused by a novel coronavirus. That means it's a new type that has not been seen in people before. This virus spreads person-to-person through droplets from coughing and sneezing. It can also spread when you are close to someone who is infected. And it can spread when you touch something that has the virus on it, such as a doorknob or a tabletop. What can you do to protect yourself from coronavirus (COVID-19)? The best way to protect yourself from getting sick is to:  · Avoid areas where there is an outbreak. · Avoid contact with people who may be infected. · Wash your hands often with soap or alcohol-based hand sanitizers. · Avoid crowds and try to stay at least 6 feet away from other people. · Wash your hands often, especially after you cough or sneeze. Use soap and water, and scrub for at least 20 seconds. If soap and water aren't available, use an alcohol-based hand . · Avoid touching your mouth, nose, and eyes. What can you do to avoid spreading the virus to others? To help avoid spreading the virus to others:  · Cover your mouth with a tissue when you cough or sneeze. Then throw the tissue in the trash. · Use a disinfectant to clean things that you touch often.   · Stay home if you are sick or have been exposed to the virus. Don't go to school, work, or public areas. And don't use public transportation. · If you are sick:  ? Leave your home only if you need to get medical care. But call the doctor's office first so they know you're coming. And wear a face mask, if you have one.  ? If you have a face mask, wear it whenever you're around other people. It can help stop the spread of the virus when you cough or sneeze. ? Clean and disinfect your home every day. Use household  and disinfectant wipes or sprays. Take special care to clean things that you grab with your hands. These include doorknobs, remote controls, phones, and handles on your refrigerator and microwave. And don't forget countertops, tabletops, bathrooms, and computer keyboards. When to call for help  Call 911 anytime you think you may need emergency care. For example, call if:  · You have severe trouble breathing. (You can't talk at all.)  · You have constant chest pain or pressure. · You are severely dizzy or lightheaded. · You are confused or can't think clearly. · Your face and lips have a blue color. · You pass out (lose consciousness) or are very hard to wake up. Call your doctor now if you develop symptoms such as:  · Shortness of breath. · Fever. · Cough. If you need to get care, call ahead to the doctor's office for instructions before you go. Make sure you wear a face mask, if you have one, to prevent exposing other people to the virus. Where can you get the latest information? The following health organizations are tracking and studying this virus. Their websites contain the most up-to-date information. Marcel Wangells also learn what to do if you think you may have been exposed to the virus. · U.[place]. Centers for Disease Control and Prevention (CDC): The CDC provides updated news about the disease and travel advice. The website also tells you how to prevent the spread of infection.  www.cdc.gov  · [address] Organization (WHO): WHO offers information about the virus outbreaks. WHO also has travel advice. www.who.int  Current as of: April 1, 2020               Content Version: 12.4  © 2006-2020 [employer]. Care instructions adapted under license by your healthcare professional. If you have questions about a medical condition or this instruction, always ask your healthcare professional. [address] any warranty or liability for your use of this information. The discharge information has been reviewed with the {PATIENT PARENT GUARDIAN:97515}. Any questions and concerns from the {PATIENT PARENT GUARDIAN:01612} have been addressed. The {PATIENT PARENT GUARDIAN:87684} verbalized understanding. CONTENTS FOUND IN YOUR DISCHARGE ENVELOPE:  [x]     Surgeon and Hospital Discharge Instructions  [x]     [facility] Surgical [facility] Care Provider Card  [x]     Medication & Side Effect Guide            (your newly prescribed medications have been marked/highlighted showing the most common side effects from   the classes of drugs on your prescriptions)  [x]     Medication Prescription(s) x *** ( [x] These have been sent electronically to your pharmacy by your surgeon,   - OR -       your surgeon has already provided these to you during a previous/pre-op office visit)  []     [address]  []     Physical Therapy Prescription  []     Follow-up Appointment Cards  []     Surgery-related Pictures/Media  []     Pain block and/or block with On-Q Catheter from Anesthesia Service (information included in your instructions above)  []     Medical device information sheets/pamphlets from their    []     School/work excuse note. []     /parent work excuse note.       The following personal items collected during your admission are returned to you:   Dental Appliance: Dental Appliances: None  Vision: Visual Aid: Glasses  Hearing Aid:    Carol Dougherty: Carol Dougherty: None  Clothing: Clothing: Footwear, Pants, Shirt, Undergarments  Other Valuables:  Other Valuables: None  Valuables sent to safe:

## 2022-03-23 ENCOUNTER — TRANSCRIBE ORDER (OUTPATIENT)
Dept: SCHEDULING | Age: 67
End: 2022-03-23

## 2022-03-23 DIAGNOSIS — Q44.4 CHOLEDOCHAL CYST: Primary | ICD-10-CM

## 2024-04-18 ENCOUNTER — OFFICE VISIT (OUTPATIENT)
Age: 69
End: 2024-04-18
Payer: MEDICARE

## 2024-04-18 DIAGNOSIS — E11.9 TYPE 2 DIABETES MELLITUS WITHOUT COMPLICATION, WITH LONG-TERM CURRENT USE OF INSULIN (HCC): Primary | ICD-10-CM

## 2024-04-18 DIAGNOSIS — Z79.4 TYPE 2 DIABETES MELLITUS WITHOUT COMPLICATION, WITH LONG-TERM CURRENT USE OF INSULIN (HCC): Primary | ICD-10-CM

## 2024-04-18 PROCEDURE — G0109 DIAB MANAGE TRN IND/GROUP: HCPCS

## 2024-04-18 NOTE — PROGRESS NOTES
[facility]  Diabetes Self-Management Education & Support Program    Reason for Referral: DSMES  Referral Source: Stephani Ospina, APRN - NP  Services requested: DSMES       ASSESSMENT    From my perspective, the participant would benefit from DSMES specifically related to reducing risks, healthy eating, monitoring, taking medications, physical activity, healthy coping, and problem solving. Will adapt DSMES program to build on participant's skills score, confidence score, and preparedness score as noted in the Diabetes Skills, Confidence, and Preparedness Index.    During the program, we will focus on providing DSMES that specifically addresses participant's interest in reducing risks, healthy eating, monitoring, taking medications, physical activity, healthy coping, and problem solving, as shown by their reported readiness to change.    The participant would be best served by attending weekly group class series.    Diabetes Self-Management Education Follow-up Visit: May 2024       Clinical Presentation  Kelly Mccarthy is a 68 y.o. White female referred for diabetes self-management education. Participant has Type 2 DM on insulin for 1-10 years. Family history negative for diabetes. Patient reports not receiving DSMES services in the past. Most recent A1c value: No results found for: \"HBA1C\", \"GAZ2UYRD\", \"LABA1C\"    Diabetes-related medical history:  Acute complications    Neurological complications  peripheral neuropathy  Microvascular disease    Macrovascular disease    Other associated conditions         Diabetes-related medications:  Current dosing: This patient does not have an active medication from one of the medication groupers.    Blood Pressure Management  This patient does not have an active medication from one of the medication groupers.      Lipid Management  pravastatin - 20 MG      Clot Prevention  This patient does not have an active medication from one of the medication

## 2024-05-08 ENCOUNTER — NURSE ONLY (OUTPATIENT)
Age: 69
End: 2024-05-08
Payer: MEDICARE

## 2024-05-08 DIAGNOSIS — E11.9 TYPE 2 DIABETES MELLITUS WITHOUT COMPLICATION, WITHOUT LONG-TERM CURRENT USE OF INSULIN (HCC): Primary | ICD-10-CM

## 2024-05-08 PROCEDURE — G0109 DIAB MANAGE TRN IND/GROUP: HCPCS

## 2024-05-09 NOTE — PROGRESS NOTES
[facility]  Diabetes Self-Management Education & Support Program  Encounter Note      SUMMARY  Diabetes self-care management training was completed related to reducing risks. The participant will return on May 15 to continue DSMES related to healthy eating and monitoring. The participant did identify SMART Goal(s) and will practice knowledge and skills related to reducing risks to improve diabetes self-management.        EVALUATION:  Participant was engaged in group discussions.  She shared that all of the pills that she tried  for diabetes made her sick. She states that she is up to date on vaccines.  She has had annual eye exam.  She was able to identify her personal care team during class.    RECOMMENDATIONS:  Continue DSMES classes  Make appointments for foot and dental exams.     TOPICS DISCUSSED TODAY:  WHAT IS DIABETES? Minutes: 60  HOW DO I STAY HEALTHY? 60      Next provider visit is scheduled for 5/15/24       SMART GOAL(S)  Check blood glucose 1  times per day over the next week.- fasting blood glucose  ACHIEVEMENT OF GOAL(S) : 0-24%         DATE DSMES TOPIC EVALUATION     5/9/2024 WHAT IS DIABETES?   Role of the normal pancreas in energy balance and blood glucose control   The defect seen in diabetes   Signs & symptoms of diabetes   Diagnosis of diabetes   Types of diabetes   Blood glucose targets in non-pregnant & non-pregnant adults       The participant knows  Their type of diabetes: Yes   The basic physiologic defect: Yes  Blood glucose targets: Yes     DATE DSMES TOPIC EVALUATION     5/9/2024 HOW DO I STAY HEALTHY?   Prevention   Vaccinations   Preconception care (if applicable)  Examinations   Eye    Foot   Diabetic complications' prevention   Dental health   Heart health   Kidney Health   Nerve health   Sleep health      The participant has a personal diabetes care record to keep abreast of diabetes health Yes                MICKEY GRANADO RN on 5/9/2024 at 10:36

## (undated) DEVICE — DRAPE,U/ SHT,SPLIT,PLAS,STERIL: Brand: MEDLINE

## (undated) DEVICE — ENDO CARRY-ON PROCEDURE KIT INCLUDES ENZYMATIC SPONGE, GAUZE, BIOHAZARD LABEL, TRAY, LUBRICANT, DIRTY SCOPE LABEL, WATER LABEL, TRAY, DRAWSTRING PAD, AND DEFENDO 4-PIECE KIT.: Brand: ENDO CARRY-ON PROCEDURE KIT

## (undated) DEVICE — BIT DRL SLD SD CUT 2.5X40MM -- DISP

## (undated) DEVICE — BIPOLAR FORCEPS CORD: Brand: VALLEYLAB

## (undated) DEVICE — SOLIDIFIER FLUID 3000 CC ABSORB

## (undated) DEVICE — TUBING, SUCTION, 1/4" X 12', STRAIGHT: Brand: MEDLINE

## (undated) DEVICE — GUIDE AIM 1.5MM --

## (undated) DEVICE — BW-400L DISP SNGL-END CLEANINGBRUSH: Brand: OLYMPUS

## (undated) DEVICE — TUBING SUCT 10FR MAL ALUM SHFT FN CAP VENT UNIV CONN W/ OBT

## (undated) DEVICE — BITE BLK ENDOSCP AD 54FR GRN POLYETH ENDOSCP W STRP SLD

## (undated) DEVICE — SUTURE ETHLN SZ 4-0 L18IN NONABSORBABLE BLK L19MM PS-2 3/8 1667H

## (undated) DEVICE — STERILE POLYISOPRENE POWDER-FREE SURGICAL GLOVES: Brand: PROTEXIS

## (undated) DEVICE — HAND I-LF: Brand: MEDLINE INDUSTRIES, INC.

## (undated) DEVICE — SUTURE MCRYL SZ 4-0 L27IN ABSRB UD L19MM PS-2 1/2 CIR PRIM Y426H

## (undated) DEVICE — NEONATAL-ADULT SPO2 SENSOR: Brand: NELLCOR

## (undated) DEVICE — BIT DRL SLD SD CUT 2X40MM DISP --

## (undated) DEVICE — Device

## (undated) DEVICE — SET EXTN TBNG L BOR 4 W STPCOCK ST 32IN PRIMING VOL 6ML

## (undated) DEVICE — KIT IV STRT W CHLORAPREP PD 1ML

## (undated) DEVICE — DRIVER SURG UNIV QUIK CONN T10 FOR VOLAR DST RAD PLATING

## (undated) DEVICE — STERILE POLYISOPRENE POWDER-FREE SURGICAL GLOVES WITH EMOLLIENT COATING: Brand: PROTEXIS

## (undated) DEVICE — COVER LT HNDL PLAS RIG 1 PER PK

## (undated) DEVICE — CRADLE BOOT: Brand: DEROYAL

## (undated) DEVICE — ZIMMER® STERILE DISPOSABLE TOURNIQUET CUFF WITH PROTECTIVE SLEEVE AND PLC, DUAL PORT, SINGLE BLADDER, 18 IN. (46 CM)

## (undated) DEVICE — 1200 GUARD II KIT W/5MM TUBE W/O VAC TUBE: Brand: GUARDIAN

## (undated) DEVICE — DRAPE C ARM W54XL84IN MINI FOR OEC 6800

## (undated) DEVICE — NEEDLE HYPO 18GA L1.5IN PNK S STL HUB POLYPR SHLD REG BVL

## (undated) DEVICE — SUTURE VCRL SZ 3-0 L27IN ABSRB UD L26MM SH 1/2 CIR J416H

## (undated) DEVICE — DRAPE,REIN 53X77,STERILE: Brand: MEDLINE

## (undated) DEVICE — KENDALL RADIOLUCENT FOAM MONITORING ELECTRODE -RECTANGULAR SHAPE: Brand: KENDALL

## (undated) DEVICE — SPLINT CAST W4XL15IN GRN STRENGTH PLSTR OF PARIS FAST SET

## (undated) DEVICE — Device: Brand: SINGLE USE SOFT BRUSH

## (undated) DEVICE — CANN NASAL O2 CAPNOGRAPHY AD -- FILTERLINE

## (undated) DEVICE — SKIN PREP TRAY W/CHG: Brand: MEDLINE INDUSTRIES, INC.

## (undated) DEVICE — CATH IV AUTOGRD BC BLU 22GA 25 -- INSYTE

## (undated) DEVICE — GOWN,SIRUS,NONRNF,SETINSLV,2XL,18/CS: Brand: MEDLINE

## (undated) DEVICE — KIT COMPLIANCE W ENDOGLDE + 11 NO BRSH ENDOKT

## (undated) DEVICE — SET ADMIN 16ML TBNG L100IN 2 Y INJ SITE IV PIGGY BK DISP

## (undated) DEVICE — BW-412T DISP COMBO CLEANING BRUSH: Brand: SINGLE USE COMBINATION CLEANING BRUSH

## (undated) DEVICE — STRIP,CLOSURE,WOUND,MEDI-STRIP,1/2X4: Brand: MEDLINE

## (undated) DEVICE — SYRINGE MED 20ML STD CLR PLAS LUERLOCK TIP N CTRL DISP

## (undated) DEVICE — 1010 S-DRAPE TOWEL DRAPE 10/BX: Brand: STERI-DRAPE™